# Patient Record
Sex: MALE | Race: BLACK OR AFRICAN AMERICAN | URBAN - METROPOLITAN AREA
[De-identification: names, ages, dates, MRNs, and addresses within clinical notes are randomized per-mention and may not be internally consistent; named-entity substitution may affect disease eponyms.]

---

## 2021-05-28 ENCOUNTER — EMERGENCY (EMERGENCY)
Facility: HOSPITAL | Age: 21
LOS: 1 days | Discharge: HOME | End: 2021-05-28
Admitting: EMERGENCY MEDICINE
Payer: COMMERCIAL

## 2021-05-28 VITALS
RESPIRATION RATE: 18 BRPM | DIASTOLIC BLOOD PRESSURE: 75 MMHG | SYSTOLIC BLOOD PRESSURE: 122 MMHG | TEMPERATURE: 98 F | HEART RATE: 68 BPM | OXYGEN SATURATION: 98 %

## 2021-05-28 DIAGNOSIS — S61.213A LACERATION WITHOUT FOREIGN BODY OF LEFT MIDDLE FINGER WITHOUT DAMAGE TO NAIL, INITIAL ENCOUNTER: ICD-10-CM
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
## 2021-05-28 DIAGNOSIS — Z23 ENCOUNTER FOR IMMUNIZATION: ICD-10-CM
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
## 2021-05-28 PROCEDURE — 99283 EMERGENCY DEPT VISIT LOW MDM: CPT | Mod: 25

## 2021-05-28 PROCEDURE — 12002 RPR S/N/AX/GEN/TRNK2.6-7.5CM: CPT

## 2021-05-28 PROCEDURE — 99053 MED SERV 10PM-8AM 24 HR FAC: CPT

## 2021-05-28 RX ORDER — TETANUS TOXOID, REDUCED DIPHTHERIA TOXOID AND ACELLULAR PERTUSSIS VACCINE, ADSORBED 5; 2.5; 8; 8; 2.5 [IU]/.5ML; [IU]/.5ML; UG/.5ML; UG/.5ML; UG/.5ML
0.5 SUSPENSION INTRAMUSCULAR ONCE
Refills: 0 | Status: COMPLETED | OUTPATIENT
Start: 2021-05-28 | End: 2021-05-28

## 2021-05-28 RX ADMIN — TETANUS TOXOID, REDUCED DIPHTHERIA TOXOID AND ACELLULAR PERTUSSIS VACCINE, ADSORBED 0.5 MILLILITER(S): 5; 2.5; 8; 8; 2.5 SUSPENSION INTRAMUSCULAR at 01:18
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
## 2021-05-28 NOTE — ED PROVIDER NOTE - PATIENT PORTAL LINK FT
You can access the FollowMyHealth Patient Portal offered by [facility] by registering at the following website: [email or web address]. By joining [facility]’s FollowMyHealth portal, you will also be able to view your health information using other applications (apps) compatible with our system.

## 2021-05-28 NOTE — ED PROVIDER NOTE - NSFOLLOWUPINSTRUCTIONS_ED_ALL_ED_FT
apply bacitracin to wounds twice daily  return in one week for suture removal  return immediately if fever, chills, swelling, discharge from wound site.

## 2021-05-28 NOTE — ED ADULT NURSE NOTE - NSIMPLEMENTINTERV_GEN_ALL_ED
Implemented All Fall Risk Interventions:  [place] to call system. Call bell, personal items and telephone within reach. Instruct patient to call for assistance. Room bathroom lighting operational. Non-slip footwear when patient is off stretcher. Physically safe environment: no spills, clutter or unnecessary equipment. Stretcher in lowest position, wheels locked, appropriate side rails in place. Provide visual cue, wrist band, yellow gown, etc. Monitor gait and stability. Monitor for mental status changes and reorient to person, place, and time. Review medications for side effects contributing to fall risk. Reinforce activity limits and safety measures with patient and family.

## 2022-06-02 NOTE — ED PROVIDER NOTE - PMH
[facility] Consult Note       Primary GI Physician: Dr. Claudia Dillon    Referring Provider:  Dr. Mildred Sauer. David Baker, ED    Consult Date:  6/2/2022    Admit Date:  6/2/2022    Chief Complaint:  GIB    Subjective:     History of Present Illness:  Patient is a 78 y.o. male with PMH including but not limited to, HTN, ABILIO, gastric cancer on current chemotherapy with plans for surgery later this month who is seen in consultation at the request of Dr.K. David Baker in the ED  for GIB. Patient presented to the ED after a syncopal episode at home. He has has increasing weakness over the last 2 days. He denies any melena, hematochezia, or hematemesis. He denies any N&V, diarrhea. In fact, he has constipation. His last BM was yesterday and was brown. He has had intermittent periumbilical abdominal pain unchanged. He denies any tobacco or ETOH. He denies any NSAIDs or anticoagulation. On evaluation in the ED, he was found to have a Hgb of 5.6 (down from 8.3 on 5/17), Hct 19.5, MCV 83.7, plt 401, and leukocytosis with WBC 20.4. BUN is normal at 23 with creat 0.40. Procalcitonin elevated at 1.02  He is being admitted, transfused and started on antibiotic therapy. CT of the abdomen/pelvis is pending. BC pending. Mr. Mary Grace Foley has a known gastric adenocarcinoma involving almost his entire stomach on active neoadjuvant chemotherapy with FLOT therapy last treated 5/25.  He is being followed by Dr. Ernestina Madden with plans for open total gastrectomy with J tube placement    3/9/2022 EUS by Dr. Jennifer Arambula revealed gastric neoplasm  Colonoscopy on 3/9/2022 by Dr. Jennifer Arambula revealed internal hemorrhoids, diverticula, 3mm polyp in the transverse colon, 4mm polyp in the ascending colon    PMH:  Past Medical History:   Diagnosis Date    HTN (hypertension)        PSH:  Past Surgical History:   Procedure Laterality Date    COLONOSCOPY N/A 3/9/2022    COLONOSCOPY/ 22 performed by Martinez Mckeon MD at [facility] ENDOSCOPY    KNEE ARTHROSCOPY      OTHER SURGICAL HISTORY      none       Allergies:  No Known Allergies    Home Medications:  Prior to Admission medications    Medication Sig Start Date End Date Taking? Authorizing Provider   dexamethasone (DECADRON) 1 MG tablet Take 1 mg by mouth 2 times a day 4/18/22   Ar Automatic Reconciliation   dexamethasone (DECADRON) 4 MG tablet Take 2 tabs twice daily the day before chemo and the day after chemo.  3/30/22   Ar Automatic Reconciliation   lidocaine-prilocaine (EMLA) 2.5-2.5 % cream Apply topically as needed 3/30/22   Ar Automatic Reconciliation   ondansetron (ZOFRAN) 8 MG tablet Take 8 mg by mouth every 8 hours as needed for Nausea 3/30/22   Ar Automatic Reconciliation   prochlorperazine (COMPAZINE) 10 MG tablet Take 5 mg by mouth every 6 hours as needed (nausea) 3/30/22   Ar Automatic Reconciliation       Hospital Medications:  Current Facility-Administered Medications   Medication Dose Route Frequency    0.9 % sodium chloride infusion   IntraVENous PRN    vancomycin (VANCOCIN) 1250 mg in sodium chloride 0.9% 250 mL IVPB  1,250 mg IntraVENous Once    0.9 % sodium chloride infusion   IntraVENous PRN    albumin human 25 % IV solution 25 g  25 g IntraVENous Q6H    pantoprazole (PROTONIX) 40 mg in sodium chloride (PF) 10 mL injection  40 mg IntraVENous Q12H    sodium chloride flush 0.9 % injection 5-40 mL  5-40 mL IntraVENous 2 times per day    sodium chloride flush 0.9 % injection 5-40 mL  5-40 mL IntraVENous PRN    0.9 % sodium chloride infusion   IntraVENous PRN    acetaminophen (TYLENOL) tablet 650 mg  650 mg Oral Q6H PRN    Or    acetaminophen (TYLENOL) suppository 650 mg  650 mg Rectal Q6H PRN    dextrose 5 % and 0.9 % sodium chloride infusion   IntraVENous Continuous    cefepime (MAXIPIME) 2000 mg IVPB minibag in NS  2,000 mg IntraVENous Q8H    0.9 % sodium chloride infusion   IntraVENous PRN     Current Outpatient Medications   Medication Sig    dexamethasone (DECADRON) 1 MG tablet Take 1 mg by mouth 2 times a day    dexamethasone (DECADRON) 4 MG tablet Take 2 tabs twice daily the day before chemo and the day after chemo.  lidocaine-prilocaine (EMLA) 2.5-2.5 % cream Apply topically as needed    ondansetron (ZOFRAN) 8 MG tablet Take 8 mg by mouth every 8 hours as needed for Nausea    prochlorperazine (COMPAZINE) 10 MG tablet Take 5 mg by mouth every 6 hours as needed (nausea)       Social History:  Social History     Tobacco Use    Smoking status: Former Smoker    Smokeless tobacco: Former User   Substance Use Topics    Alcohol use: Not Currently       Family History:  Family History   Problem Relation Age of Onset    Other Other         non-contributory       Review of Systems:  A detailed 10 system ROS is obtained, with pertinent positives as listed above. All others are negative. Diet:  Clear liquid nothing red    Objective:     Physical Exam:  Vitals:  BP 94/62   Pulse 99   Resp 18   Ht 5' 9\" (1.753 m)   Wt 118 lb (53.5 kg)   SpO2 94%   BMI 17.43 kg/m²   Gen:  Pt is alert, cooperative, no acute distress PALE; FAMILY AT BEDSIDE; THIN  Skin:  Extremities and face reveal no rashes. HEENT: Sclerae anicteric. Extra-occular muscles are intact. No oral ulcers. No abnormal pigmentation of the lips. The neck is supple. Cardiovascular:TACHYCARDIC. No murmurs, gallops, or rubs. Respiratory:  Comfortable breathing with no accessory muscle use. Clear breath sounds anteriorly with no wheezes, rales, or rhonchi. GI:  Abdomen nondistended, soft, and nontender. Normal active bowel sounds. No enlargement of the liver or spleen. No masses palpable. Musculoskeletal:  No pitting edema of the lower legs. Neurological:  Gross memory appears intact. Patient is alert and oriented. Psychiatric:  Mood appears appropriate with judgement intact. Lymphatic:  No cervical or supraclavicular adenopathy.     Laboratory:    Recent Labs     06/02/22  1312   WBC 20.4*   HGB 5.6*   HCT 19.5*      MCV 83.7 *   K 4.6   CL 96*   CO2 25   BUN 23   CREATININE 0.40*   CALCIUM 7.9*   MG 1.8   GLUCOSE 195*   ALKPHOS 181*   AST 17   ALT 15   BILITOT 0.2   ALBUMIN 0.3*   PROT 4.1*         Assessment:     Principal Problem:    Shock (HCC)  Active Problems:    Severe sepsis (HCC)    ABLA (acute blood loss anemia)    ABILIO (iron deficiency anemia)    Malignant neoplasm of overlapping sites of stomach (HCC)  Resolved Problems:    * No resolved hospital problems. *  78 y.o. male with PMH including but not limited to, HTN, ABILIO, gastric cancer on current chemotherapy with plans for surgery later this month who is seen in consultation at the request of Dr.K. Ciara Eden in the ED  for GIB. Patient presented to the ED after a syncopal episode at home. On evaluation in the ED, he was found to have a Hgb of 5.6 (down from 8.3 on 5/17), Hct 19.5, MCV 83.7, plt 401, and leukocytosis with WBC 20.4. BUN is normal at 23 with creat 0.40. Procalcitonin elevated at 1.02  He has no overt bleeding, but heme positive stool. He is being admitted, transfused and started on antibiotic therapy. CT of the abdomen/pelvis and BC are pending. Mr. Michele Garduno has a known gastric adenocarcinoma involving almost his entire stomach on active neoadjuvant chemotherapy with FLOT therapy last treated 5/25. He is being followed by Dr. Angeli Alarcon with plans for open total gastrectomy with J tube placement      Plan:     -Agree with transfusion  -Await results of CT scan of the abdomen/pelvis  -Agree with antibiotic therapy  -No plans for endoscopy at this time with known gastric cancer, known ABILIO on active chemotherapy without overt bleeding. Will continue to follow and if develops overt bleeding, then will schedule for repeat EGD. -Agree with serial H&H and supportive care  -Continue pantoprazole 40mg IV BID    Lodema Les.  Keny Garrett 33  [facility]    Patient is seen and examined in collaboration with Dr. Selma Moore and plan as per  Kanika Overcast. No pertinent past medical history <<----- Click to add NO pertinent Past Medical History

## 2024-03-02 PROBLEM — Z78.9 OTHER SPECIFIED HEALTH STATUS: Chronic | Status: ACTIVE | Noted: 2021-05-30
